# Patient Record
Sex: MALE | Race: OTHER | NOT HISPANIC OR LATINO | URBAN - METROPOLITAN AREA
[De-identification: names, ages, dates, MRNs, and addresses within clinical notes are randomized per-mention and may not be internally consistent; named-entity substitution may affect disease eponyms.]

---

## 2017-06-12 ENCOUNTER — EMERGENCY (EMERGENCY)
Facility: HOSPITAL | Age: 27
LOS: 1 days | Discharge: HOME | End: 2017-06-12
Attending: EMERGENCY MEDICINE | Admitting: EMERGENCY MEDICINE
Payer: SELF-PAY

## 2017-06-12 VITALS
TEMPERATURE: 98 F | RESPIRATION RATE: 16 BRPM | DIASTOLIC BLOOD PRESSURE: 81 MMHG | HEART RATE: 84 BPM | OXYGEN SATURATION: 100 % | SYSTOLIC BLOOD PRESSURE: 122 MMHG

## 2017-06-12 PROCEDURE — 99282 EMERGENCY DEPT VISIT SF MDM: CPT

## 2017-06-12 PROCEDURE — 99283 EMERGENCY DEPT VISIT LOW MDM: CPT | Mod: 25

## 2017-06-12 PROCEDURE — 99053 MED SERV 10PM-8AM 24 HR FAC: CPT

## 2017-06-12 NOTE — ED ADULT NURSE NOTE - OBJECTIVE STATEMENT
25 y/o M, PMH cold sores, presents to ED c/o cold sore on right upper lip that developed Thursday and second cold sore that developed last night. Pt has been using Orajel, Aubio and Releev ointments with no relief. Pt does not have PCP. Pt reports headache all day today, currently 6/10, pt did not take any meds for headache. Pt denies dizziness, chest pain, palpitations, cough, body aches, SOB, abdominal pain, n/v/d, urinary symptoms, fevers, chills, weakness at this time. Right upper lip is swollen and  crusty. Safety maintained.

## 2017-06-12 NOTE — ED ADULT NURSE NOTE - DISCHARGE TEACHING
MD Henson verbally reviewed d/c instructions with pt, then went to print s/c instructions out to give to RN, pt left before signing

## 2017-06-13 VITALS
SYSTOLIC BLOOD PRESSURE: 128 MMHG | DIASTOLIC BLOOD PRESSURE: 78 MMHG | RESPIRATION RATE: 18 BRPM | TEMPERATURE: 98 F | HEART RATE: 78 BPM | OXYGEN SATURATION: 100 %

## 2017-06-13 RX ORDER — IBUPROFEN 200 MG
600 TABLET ORAL ONCE
Qty: 0 | Refills: 0 | Status: COMPLETED | OUTPATIENT
Start: 2017-06-13 | End: 2017-06-13

## 2017-06-13 NOTE — ED PROVIDER NOTE - OBJECTIVE STATEMENT
26M no PMH pw cold sores.  started 5 days ago, has been applying creams but has had more lesions on mouth since.  No fever, no drainage. 26M no PMH pw cold sores.  started 5 days ago, has been applying creams but has had more lesions on lip since.  No fever, no drainage.

## 2017-06-13 NOTE — ED PROVIDER NOTE - ATTENDING CONTRIBUTION TO CARE
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 Mild headache, gcs 15, no focal neuro deficits. well appearing. no visual changes, no lesions on nose or intraoral or mucus membranes.   symptoms have been for 4-5 days. outside window for benefit of antiviral tx.   plan for continued use of topical creams, given clinic f/u.